# Patient Record
Sex: FEMALE | Race: WHITE | Employment: FULL TIME | URBAN - METROPOLITAN AREA
[De-identification: names, ages, dates, MRNs, and addresses within clinical notes are randomized per-mention and may not be internally consistent; named-entity substitution may affect disease eponyms.]

---

## 2017-02-15 ENCOUNTER — ANESTHESIA EVENT (OUTPATIENT)
Dept: SURGERY | Age: 42
End: 2017-02-15
Payer: COMMERCIAL

## 2017-02-15 NOTE — PERIOP NOTES
Spoke to Shon Caballero at Dr. Jose E Coleman office earlier this morning requesting patient come to PAT today for a T&S. Patient is not able to come.   DOS: 2/16/2017

## 2017-02-16 ENCOUNTER — ANESTHESIA (OUTPATIENT)
Dept: SURGERY | Age: 42
End: 2017-02-16
Payer: COMMERCIAL

## 2017-02-16 ENCOUNTER — HOSPITAL ENCOUNTER (OUTPATIENT)
Age: 42
Setting detail: OBSERVATION
Discharge: HOME | End: 2017-02-17
Attending: OBSTETRICS & GYNECOLOGY | Admitting: OBSTETRICS & GYNECOLOGY
Payer: COMMERCIAL

## 2017-02-16 ENCOUNTER — SURGERY (OUTPATIENT)
Age: 42
End: 2017-02-16

## 2017-02-16 LAB
ABO + RH BLD: NORMAL
ANION GAP BLD CALC-SCNC: 9 MMOL/L (ref 5–15)
BASOPHILS # BLD AUTO: 0 K/UL (ref 0–0.1)
BASOPHILS # BLD: 1 % (ref 0–1)
BLOOD GROUP ANTIBODIES SERPL: NORMAL
BUN SERPL-MCNC: 13 MG/DL (ref 6–20)
BUN/CREAT SERPL: 19 (ref 12–20)
CALCIUM SERPL-MCNC: 9.2 MG/DL (ref 8.5–10.1)
CHLORIDE SERPL-SCNC: 104 MMOL/L (ref 97–108)
CO2 SERPL-SCNC: 26 MMOL/L (ref 21–32)
CREAT SERPL-MCNC: 0.68 MG/DL (ref 0.55–1.02)
EOSINOPHIL # BLD: 0.2 K/UL (ref 0–0.4)
EOSINOPHIL NFR BLD: 4 % (ref 0–7)
ERYTHROCYTE [DISTWIDTH] IN BLOOD BY AUTOMATED COUNT: 12.9 % (ref 11.5–14.5)
GLUCOSE SERPL-MCNC: 98 MG/DL (ref 65–100)
HCG UR QL: NEGATIVE
HCT VFR BLD AUTO: 43.3 % (ref 35–47)
HGB BLD-MCNC: 14.6 G/DL (ref 11.5–16)
LYMPHOCYTES # BLD AUTO: 38 % (ref 12–49)
LYMPHOCYTES # BLD: 2.5 K/UL (ref 0.8–3.5)
MCH RBC QN AUTO: 30.5 PG (ref 26–34)
MCHC RBC AUTO-ENTMCNC: 33.7 G/DL (ref 30–36.5)
MCV RBC AUTO: 90.4 FL (ref 80–99)
MONOCYTES # BLD: 0.6 K/UL (ref 0–1)
MONOCYTES NFR BLD AUTO: 9 % (ref 5–13)
NEUTS SEG # BLD: 3.1 K/UL (ref 1.8–8)
NEUTS SEG NFR BLD AUTO: 48 % (ref 32–75)
PLATELET # BLD AUTO: 265 K/UL (ref 150–400)
POTASSIUM SERPL-SCNC: 3.3 MMOL/L (ref 3.5–5.1)
RBC # BLD AUTO: 4.79 M/UL (ref 3.8–5.2)
SODIUM SERPL-SCNC: 139 MMOL/L (ref 136–145)
SPECIMEN EXP DATE BLD: NORMAL
WBC # BLD AUTO: 6.4 K/UL (ref 3.6–11)

## 2017-02-16 PROCEDURE — 99218 HC RM OBSERVATION: CPT

## 2017-02-16 PROCEDURE — 77030008756 HC TU IRR SUC STRY -B: Performed by: OBSTETRICS & GYNECOLOGY

## 2017-02-16 PROCEDURE — 74011250636 HC RX REV CODE- 250/636: Performed by: ANESTHESIOLOGY

## 2017-02-16 PROCEDURE — 77030008771 HC TU NG SALEM SUMP -A: Performed by: NURSE ANESTHETIST, CERTIFIED REGISTERED

## 2017-02-16 PROCEDURE — 77030008520 HC TBNG INSUF HFLO STOR -B: Performed by: OBSTETRICS & GYNECOLOGY

## 2017-02-16 PROCEDURE — 77030018673: Performed by: OBSTETRICS & GYNECOLOGY

## 2017-02-16 PROCEDURE — 74011250636 HC RX REV CODE- 250/636

## 2017-02-16 PROCEDURE — 77030018836 HC SOL IRR NACL ICUM -A: Performed by: OBSTETRICS & GYNECOLOGY

## 2017-02-16 PROCEDURE — 77030010547 HC BG URIN W/UMETER -A

## 2017-02-16 PROCEDURE — 76210000000 HC OR PH I REC 2 TO 2.5 HR: Performed by: OBSTETRICS & GYNECOLOGY

## 2017-02-16 PROCEDURE — 77030022704 HC SUT VLOC COVD -B: Performed by: OBSTETRICS & GYNECOLOGY

## 2017-02-16 PROCEDURE — 74011258636 HC RX REV CODE- 258/636: Performed by: OBSTETRICS & GYNECOLOGY

## 2017-02-16 PROCEDURE — 74011000250 HC RX REV CODE- 250: Performed by: ANESTHESIOLOGY

## 2017-02-16 PROCEDURE — 77030034850: Performed by: OBSTETRICS & GYNECOLOGY

## 2017-02-16 PROCEDURE — 77030028268: Performed by: OBSTETRICS & GYNECOLOGY

## 2017-02-16 PROCEDURE — 77030026438 HC STYL ET INTUB CARD -A: Performed by: NURSE ANESTHETIST, CERTIFIED REGISTERED

## 2017-02-16 PROCEDURE — 77030035048 HC TRCR ENDOSC OPTCL COVD -B: Performed by: OBSTETRICS & GYNECOLOGY

## 2017-02-16 PROCEDURE — 77030002888 HC SUT CHRMC J&J -A: Performed by: OBSTETRICS & GYNECOLOGY

## 2017-02-16 PROCEDURE — 74011000250 HC RX REV CODE- 250: Performed by: OBSTETRICS & GYNECOLOGY

## 2017-02-16 PROCEDURE — 77030016151 HC PROTCTR LNS DFOG COVD -B: Performed by: OBSTETRICS & GYNECOLOGY

## 2017-02-16 PROCEDURE — 77030011640 HC PAD GRND REM COVD -A: Performed by: OBSTETRICS & GYNECOLOGY

## 2017-02-16 PROCEDURE — 77030002933 HC SUT MCRYL J&J -A: Performed by: OBSTETRICS & GYNECOLOGY

## 2017-02-16 PROCEDURE — 74011250636 HC RX REV CODE- 250/636: Performed by: OBSTETRICS & GYNECOLOGY

## 2017-02-16 PROCEDURE — 36415 COLL VENOUS BLD VENIPUNCTURE: CPT | Performed by: OBSTETRICS & GYNECOLOGY

## 2017-02-16 PROCEDURE — 77030008684 HC TU ET CUF COVD -B: Performed by: NURSE ANESTHETIST, CERTIFIED REGISTERED

## 2017-02-16 PROCEDURE — 77030035277 HC OBTRTR BLDELSS DISP INTU -B: Performed by: OBSTETRICS & GYNECOLOGY

## 2017-02-16 PROCEDURE — 77030035044 HC TRCR ENDOSC VRSPRT BLDLSS COVD -C: Performed by: OBSTETRICS & GYNECOLOGY

## 2017-02-16 PROCEDURE — 77030019908 HC STETH ESOPH SIMS -A: Performed by: NURSE ANESTHETIST, CERTIFIED REGISTERED

## 2017-02-16 PROCEDURE — 85025 COMPLETE CBC W/AUTO DIFF WBC: CPT | Performed by: OBSTETRICS & GYNECOLOGY

## 2017-02-16 PROCEDURE — 74011000250 HC RX REV CODE- 250

## 2017-02-16 PROCEDURE — 81025 URINE PREGNANCY TEST: CPT

## 2017-02-16 PROCEDURE — 80048 BASIC METABOLIC PNL TOTAL CA: CPT | Performed by: OBSTETRICS & GYNECOLOGY

## 2017-02-16 PROCEDURE — 77030010507 HC ADH SKN DERMBND J&J -B: Performed by: OBSTETRICS & GYNECOLOGY

## 2017-02-16 PROCEDURE — 77030020061 HC IV BLD WRMR ADMIN SET 3M -B: Performed by: NURSE ANESTHETIST, CERTIFIED REGISTERED

## 2017-02-16 PROCEDURE — 74011250637 HC RX REV CODE- 250/637: Performed by: OBSTETRICS & GYNECOLOGY

## 2017-02-16 PROCEDURE — 76060000037 HC ANESTHESIA 3 TO 3.5 HR: Performed by: OBSTETRICS & GYNECOLOGY

## 2017-02-16 PROCEDURE — 77030013079 HC BLNKT BAIR HGGR 3M -A: Performed by: NURSE ANESTHETIST, CERTIFIED REGISTERED

## 2017-02-16 PROCEDURE — 77030035043 HC TRCR ENDOSC OPTCL BLDLSS COVD -B: Performed by: OBSTETRICS & GYNECOLOGY

## 2017-02-16 PROCEDURE — 77030037032 HC INSRT SCIS CLICKLLINE DISP STOR -B: Performed by: OBSTETRICS & GYNECOLOGY

## 2017-02-16 PROCEDURE — 76010000878 HC OR TIME 3 TO 3.5HR INTENSV - TIER 2: Performed by: OBSTETRICS & GYNECOLOGY

## 2017-02-16 PROCEDURE — 88307 TISSUE EXAM BY PATHOLOGIST: CPT | Performed by: OBSTETRICS & GYNECOLOGY

## 2017-02-16 PROCEDURE — 77030019927 HC TBNG IRR CYSTO BAXT -A: Performed by: OBSTETRICS & GYNECOLOGY

## 2017-02-16 PROCEDURE — 77030009848 HC PASSR SUT SET COOP -C: Performed by: OBSTETRICS & GYNECOLOGY

## 2017-02-16 PROCEDURE — 86900 BLOOD TYPING SEROLOGIC ABO: CPT | Performed by: OBSTETRICS & GYNECOLOGY

## 2017-02-16 PROCEDURE — 77030003575 HC NDL INSUF ENDOPTH J&J -B: Performed by: OBSTETRICS & GYNECOLOGY

## 2017-02-16 PROCEDURE — 77030018832 HC SOL IRR H20 ICUM -A: Performed by: OBSTETRICS & GYNECOLOGY

## 2017-02-16 PROCEDURE — 77030018778 HC MANIP UTER VCAR CNMD -B: Performed by: OBSTETRICS & GYNECOLOGY

## 2017-02-16 PROCEDURE — 77030020782 HC GWN BAIR PAWS FLX 3M -B

## 2017-02-16 PROCEDURE — 77030031139 HC SUT VCRL2 J&J -A: Performed by: OBSTETRICS & GYNECOLOGY

## 2017-02-16 RX ORDER — DEXTROSE, SODIUM CHLORIDE, SODIUM LACTATE, POTASSIUM CHLORIDE, AND CALCIUM CHLORIDE 5; .6; .31; .03; .02 G/100ML; G/100ML; G/100ML; G/100ML; G/100ML
125 INJECTION, SOLUTION INTRAVENOUS CONTINUOUS
Status: DISPENSED | OUTPATIENT
Start: 2017-02-16 | End: 2017-02-17

## 2017-02-16 RX ORDER — ZOLPIDEM TARTRATE 5 MG/1
5 TABLET ORAL
Status: DISCONTINUED | OUTPATIENT
Start: 2017-02-16 | End: 2017-02-17 | Stop reason: HOSPADM

## 2017-02-16 RX ORDER — DIPHENHYDRAMINE HYDROCHLORIDE 50 MG/ML
12.5 INJECTION, SOLUTION INTRAMUSCULAR; INTRAVENOUS
Status: DISCONTINUED | OUTPATIENT
Start: 2017-02-16 | End: 2017-02-17 | Stop reason: HOSPADM

## 2017-02-16 RX ORDER — HYDROMORPHONE HCL IN 0.9% NACL 15 MG/30ML
PATIENT CONTROLLED ANALGESIA VIAL INTRAVENOUS CONTINUOUS
Status: DISCONTINUED | OUTPATIENT
Start: 2017-02-16 | End: 2017-02-17 | Stop reason: HOSPADM

## 2017-02-16 RX ORDER — SODIUM CHLORIDE 0.9 % (FLUSH) 0.9 %
5-10 SYRINGE (ML) INJECTION EVERY 8 HOURS
Status: DISCONTINUED | OUTPATIENT
Start: 2017-02-16 | End: 2017-02-16 | Stop reason: HOSPADM

## 2017-02-16 RX ORDER — DEXAMETHASONE SODIUM PHOSPHATE 4 MG/ML
INJECTION, SOLUTION INTRA-ARTICULAR; INTRALESIONAL; INTRAMUSCULAR; INTRAVENOUS; SOFT TISSUE AS NEEDED
Status: DISCONTINUED | OUTPATIENT
Start: 2017-02-16 | End: 2017-02-16 | Stop reason: HOSPADM

## 2017-02-16 RX ORDER — PROPOFOL 10 MG/ML
INJECTION, EMULSION INTRAVENOUS AS NEEDED
Status: DISCONTINUED | OUTPATIENT
Start: 2017-02-16 | End: 2017-02-16 | Stop reason: HOSPADM

## 2017-02-16 RX ORDER — LORAZEPAM 1 MG/1
1 TABLET ORAL
Status: DISCONTINUED | OUTPATIENT
Start: 2017-02-16 | End: 2017-02-17 | Stop reason: HOSPADM

## 2017-02-16 RX ORDER — GLYCOPYRROLATE 0.2 MG/ML
INJECTION INTRAMUSCULAR; INTRAVENOUS AS NEEDED
Status: DISCONTINUED | OUTPATIENT
Start: 2017-02-16 | End: 2017-02-16 | Stop reason: HOSPADM

## 2017-02-16 RX ORDER — NEOSTIGMINE METHYLSULFATE 1 MG/ML
INJECTION INTRAVENOUS AS NEEDED
Status: DISCONTINUED | OUTPATIENT
Start: 2017-02-16 | End: 2017-02-16 | Stop reason: HOSPADM

## 2017-02-16 RX ORDER — LIDOCAINE HYDROCHLORIDE 20 MG/ML
INJECTION, SOLUTION EPIDURAL; INFILTRATION; INTRACAUDAL; PERINEURAL AS NEEDED
Status: DISCONTINUED | OUTPATIENT
Start: 2017-02-16 | End: 2017-02-16 | Stop reason: HOSPADM

## 2017-02-16 RX ORDER — DOCUSATE SODIUM 100 MG/1
100 CAPSULE, LIQUID FILLED ORAL 2 TIMES DAILY
Status: DISCONTINUED | OUTPATIENT
Start: 2017-02-16 | End: 2017-02-17 | Stop reason: HOSPADM

## 2017-02-16 RX ORDER — SODIUM CHLORIDE, SODIUM LACTATE, POTASSIUM CHLORIDE, CALCIUM CHLORIDE 600; 310; 30; 20 MG/100ML; MG/100ML; MG/100ML; MG/100ML
25 INJECTION, SOLUTION INTRAVENOUS CONTINUOUS
Status: DISCONTINUED | OUTPATIENT
Start: 2017-02-16 | End: 2017-02-16 | Stop reason: HOSPADM

## 2017-02-16 RX ORDER — HYDROMORPHONE HYDROCHLORIDE 1 MG/ML
1 INJECTION, SOLUTION INTRAMUSCULAR; INTRAVENOUS; SUBCUTANEOUS
Status: DISCONTINUED | OUTPATIENT
Start: 2017-02-16 | End: 2017-02-17 | Stop reason: HOSPADM

## 2017-02-16 RX ORDER — HYDROMORPHONE HYDROCHLORIDE 2 MG/ML
INJECTION, SOLUTION INTRAMUSCULAR; INTRAVENOUS; SUBCUTANEOUS AS NEEDED
Status: DISCONTINUED | OUTPATIENT
Start: 2017-02-16 | End: 2017-02-16 | Stop reason: HOSPADM

## 2017-02-16 RX ORDER — METHYLENE BLUE 10 MG/ML
INJECTION INTRAVENOUS AS NEEDED
Status: DISCONTINUED | OUTPATIENT
Start: 2017-02-16 | End: 2017-02-16 | Stop reason: HOSPADM

## 2017-02-16 RX ORDER — SODIUM CHLORIDE, SODIUM LACTATE, POTASSIUM CHLORIDE, CALCIUM CHLORIDE 600; 310; 30; 20 MG/100ML; MG/100ML; MG/100ML; MG/100ML
1000 INJECTION, SOLUTION INTRAVENOUS CONTINUOUS
Status: DISCONTINUED | OUTPATIENT
Start: 2017-02-16 | End: 2017-02-16 | Stop reason: HOSPADM

## 2017-02-16 RX ORDER — ONDANSETRON 2 MG/ML
4 INJECTION INTRAMUSCULAR; INTRAVENOUS
Status: DISCONTINUED | OUTPATIENT
Start: 2017-02-16 | End: 2017-02-17 | Stop reason: HOSPADM

## 2017-02-16 RX ORDER — MIDAZOLAM HYDROCHLORIDE 1 MG/ML
INJECTION, SOLUTION INTRAMUSCULAR; INTRAVENOUS AS NEEDED
Status: DISCONTINUED | OUTPATIENT
Start: 2017-02-16 | End: 2017-02-16 | Stop reason: HOSPADM

## 2017-02-16 RX ORDER — BUPIVACAINE HYDROCHLORIDE 5 MG/ML
30 INJECTION, SOLUTION EPIDURAL; INTRACAUDAL ONCE
Status: COMPLETED | OUTPATIENT
Start: 2017-02-16 | End: 2017-02-16

## 2017-02-16 RX ORDER — ROCURONIUM BROMIDE 10 MG/ML
INJECTION, SOLUTION INTRAVENOUS AS NEEDED
Status: DISCONTINUED | OUTPATIENT
Start: 2017-02-16 | End: 2017-02-16 | Stop reason: HOSPADM

## 2017-02-16 RX ORDER — METRONIDAZOLE 500 MG/100ML
500 INJECTION, SOLUTION INTRAVENOUS ONCE
Status: COMPLETED | OUTPATIENT
Start: 2017-02-16 | End: 2017-02-16

## 2017-02-16 RX ORDER — FENTANYL CITRATE 50 UG/ML
INJECTION, SOLUTION INTRAMUSCULAR; INTRAVENOUS AS NEEDED
Status: DISCONTINUED | OUTPATIENT
Start: 2017-02-16 | End: 2017-02-16 | Stop reason: HOSPADM

## 2017-02-16 RX ORDER — SODIUM CHLORIDE, SODIUM LACTATE, POTASSIUM CHLORIDE, CALCIUM CHLORIDE 600; 310; 30; 20 MG/100ML; MG/100ML; MG/100ML; MG/100ML
125 INJECTION, SOLUTION INTRAVENOUS CONTINUOUS
Status: DISCONTINUED | OUTPATIENT
Start: 2017-02-16 | End: 2017-02-16 | Stop reason: HOSPADM

## 2017-02-16 RX ORDER — ONDANSETRON 2 MG/ML
INJECTION INTRAMUSCULAR; INTRAVENOUS AS NEEDED
Status: DISCONTINUED | OUTPATIENT
Start: 2017-02-16 | End: 2017-02-16 | Stop reason: HOSPADM

## 2017-02-16 RX ORDER — SODIUM CHLORIDE 0.9 % (FLUSH) 0.9 %
5-10 SYRINGE (ML) INJECTION AS NEEDED
Status: DISCONTINUED | OUTPATIENT
Start: 2017-02-16 | End: 2017-02-16 | Stop reason: HOSPADM

## 2017-02-16 RX ORDER — KETOROLAC TROMETHAMINE 30 MG/ML
30 INJECTION, SOLUTION INTRAMUSCULAR; INTRAVENOUS EVERY 6 HOURS
Status: COMPLETED | OUTPATIENT
Start: 2017-02-16 | End: 2017-02-17

## 2017-02-16 RX ORDER — OXYCODONE AND ACETAMINOPHEN 5; 325 MG/1; MG/1
2 TABLET ORAL
Status: DISCONTINUED | OUTPATIENT
Start: 2017-02-16 | End: 2017-02-17 | Stop reason: HOSPADM

## 2017-02-16 RX ORDER — NALOXONE HYDROCHLORIDE 0.4 MG/ML
0.4 INJECTION, SOLUTION INTRAMUSCULAR; INTRAVENOUS; SUBCUTANEOUS AS NEEDED
Status: DISCONTINUED | OUTPATIENT
Start: 2017-02-16 | End: 2017-02-17 | Stop reason: HOSPADM

## 2017-02-16 RX ORDER — KETOROLAC TROMETHAMINE 30 MG/ML
INJECTION, SOLUTION INTRAMUSCULAR; INTRAVENOUS AS NEEDED
Status: DISCONTINUED | OUTPATIENT
Start: 2017-02-16 | End: 2017-02-16 | Stop reason: HOSPADM

## 2017-02-16 RX ORDER — LIDOCAINE HYDROCHLORIDE 10 MG/ML
0.1 INJECTION, SOLUTION EPIDURAL; INFILTRATION; INTRACAUDAL; PERINEURAL AS NEEDED
Status: DISCONTINUED | OUTPATIENT
Start: 2017-02-16 | End: 2017-02-16 | Stop reason: HOSPADM

## 2017-02-16 RX ORDER — CEFAZOLIN SODIUM IN 0.9 % NACL 2 G/50 ML
2 INTRAVENOUS SOLUTION, PIGGYBACK (ML) INTRAVENOUS ONCE
Status: COMPLETED | OUTPATIENT
Start: 2017-02-16 | End: 2017-02-16

## 2017-02-16 RX ORDER — HYDROMORPHONE HYDROCHLORIDE 1 MG/ML
.25-.5 INJECTION, SOLUTION INTRAMUSCULAR; INTRAVENOUS; SUBCUTANEOUS
Status: DISCONTINUED | OUTPATIENT
Start: 2017-02-16 | End: 2017-02-16 | Stop reason: HOSPADM

## 2017-02-16 RX ADMIN — DEXAMETHASONE SODIUM PHOSPHATE 8 MG: 4 INJECTION, SOLUTION INTRA-ARTICULAR; INTRALESIONAL; INTRAMUSCULAR; INTRAVENOUS; SOFT TISSUE at 08:17

## 2017-02-16 RX ADMIN — FENTANYL CITRATE 50 MCG: 50 INJECTION, SOLUTION INTRAMUSCULAR; INTRAVENOUS at 09:28

## 2017-02-16 RX ADMIN — Medication: at 19:10

## 2017-02-16 RX ADMIN — FENTANYL CITRATE 50 MCG: 50 INJECTION, SOLUTION INTRAMUSCULAR; INTRAVENOUS at 08:50

## 2017-02-16 RX ADMIN — DOCUSATE SODIUM 100 MG: 100 CAPSULE ORAL at 18:05

## 2017-02-16 RX ADMIN — MIDAZOLAM HYDROCHLORIDE 2 MG: 1 INJECTION, SOLUTION INTRAMUSCULAR; INTRAVENOUS at 07:43

## 2017-02-16 RX ADMIN — ROCURONIUM BROMIDE 15 MG: 10 INJECTION, SOLUTION INTRAVENOUS at 08:50

## 2017-02-16 RX ADMIN — SODIUM CHLORIDE, POTASSIUM CHLORIDE, SODIUM LACTATE AND CALCIUM CHLORIDE: 600; 310; 30; 20 INJECTION, SOLUTION INTRAVENOUS at 09:37

## 2017-02-16 RX ADMIN — KETOROLAC TROMETHAMINE 30 MG: 30 INJECTION, SOLUTION INTRAMUSCULAR; INTRAVENOUS at 10:35

## 2017-02-16 RX ADMIN — SODIUM CHLORIDE, POTASSIUM CHLORIDE, SODIUM LACTATE AND CALCIUM CHLORIDE: 600; 310; 30; 20 INJECTION, SOLUTION INTRAVENOUS at 07:00

## 2017-02-16 RX ADMIN — BUPIVACAINE HYDROCHLORIDE 15 ML: 5 INJECTION, SOLUTION EPIDURAL; INTRACAUDAL; PERINEURAL at 10:35

## 2017-02-16 RX ADMIN — ROCURONIUM BROMIDE 15 MG: 10 INJECTION, SOLUTION INTRAVENOUS at 09:19

## 2017-02-16 RX ADMIN — LIDOCAINE HYDROCHLORIDE 30 MG: 20 INJECTION, SOLUTION EPIDURAL; INFILTRATION; INTRACAUDAL; PERINEURAL at 07:48

## 2017-02-16 RX ADMIN — CEFAZOLIN 2 G: 1 INJECTION, POWDER, FOR SOLUTION INTRAMUSCULAR; INTRAVENOUS; PARENTERAL at 07:57

## 2017-02-16 RX ADMIN — FENTANYL CITRATE 50 MCG: 50 INJECTION, SOLUTION INTRAMUSCULAR; INTRAVENOUS at 07:48

## 2017-02-16 RX ADMIN — METRONIDAZOLE 500 MG: 500 INJECTION, SOLUTION INTRAVENOUS at 07:57

## 2017-02-16 RX ADMIN — SODIUM CHLORIDE, SODIUM LACTATE, POTASSIUM CHLORIDE, CALCIUM CHLORIDE, AND DEXTROSE MONOHYDRATE 125 ML/HR: 600; 310; 30; 20; 5 INJECTION, SOLUTION INTRAVENOUS at 19:50

## 2017-02-16 RX ADMIN — SODIUM CHLORIDE 12.5 MG: 9 INJECTION INTRAMUSCULAR; INTRAVENOUS; SUBCUTANEOUS at 11:20

## 2017-02-16 RX ADMIN — ONDANSETRON 4 MG: 2 INJECTION INTRAMUSCULAR; INTRAVENOUS at 10:35

## 2017-02-16 RX ADMIN — METHYLENE BLUE 5 ML: 10 INJECTION INTRAVENOUS at 10:14

## 2017-02-16 RX ADMIN — FENTANYL CITRATE 50 MCG: 50 INJECTION, SOLUTION INTRAMUSCULAR; INTRAVENOUS at 07:57

## 2017-02-16 RX ADMIN — PROPOFOL 120 MG: 10 INJECTION, EMULSION INTRAVENOUS at 07:48

## 2017-02-16 RX ADMIN — KETOROLAC TROMETHAMINE 30 MG: 30 INJECTION, SOLUTION INTRAMUSCULAR at 18:05

## 2017-02-16 RX ADMIN — ROCURONIUM BROMIDE 30 MG: 10 INJECTION, SOLUTION INTRAVENOUS at 07:50

## 2017-02-16 RX ADMIN — FENTANYL CITRATE 50 MCG: 50 INJECTION, SOLUTION INTRAMUSCULAR; INTRAVENOUS at 07:45

## 2017-02-16 RX ADMIN — Medication: at 11:48

## 2017-02-16 RX ADMIN — ROCURONIUM BROMIDE 5 MG: 10 INJECTION, SOLUTION INTRAVENOUS at 07:48

## 2017-02-16 RX ADMIN — HYDROMORPHONE HYDROCHLORIDE 0.5 MG: 1 INJECTION, SOLUTION INTRAMUSCULAR; INTRAVENOUS; SUBCUTANEOUS at 11:21

## 2017-02-16 RX ADMIN — NEOSTIGMINE METHYLSULFATE 2 MG: 1 INJECTION INTRAVENOUS at 10:35

## 2017-02-16 RX ADMIN — HYDROMORPHONE HYDROCHLORIDE 0.5 MG: 2 INJECTION, SOLUTION INTRAMUSCULAR; INTRAVENOUS; SUBCUTANEOUS at 10:58

## 2017-02-16 RX ADMIN — MIDAZOLAM HYDROCHLORIDE 3 MG: 1 INJECTION, SOLUTION INTRAMUSCULAR; INTRAVENOUS at 07:40

## 2017-02-16 RX ADMIN — GLYCOPYRROLATE 0.2 MG: 0.2 INJECTION INTRAMUSCULAR; INTRAVENOUS at 10:35

## 2017-02-16 RX ADMIN — SODIUM CHLORIDE, SODIUM LACTATE, POTASSIUM CHLORIDE, CALCIUM CHLORIDE, AND DEXTROSE MONOHYDRATE 125 ML/HR: 600; 310; 30; 20; 5 INJECTION, SOLUTION INTRAVENOUS at 12:04

## 2017-02-16 NOTE — ANESTHESIA POSTPROCEDURE EVALUATION
Post-Anesthesia Evaluation and Assessment    Patient: Magali Hoang MRN: [id number]  SSN: [id number]    YOB: 1975  Age: 37 y.o. Sex: female       Cardiovascular Function/Vital Signs  Visit Vitals    /55    Pulse (!) 57    Temp 36.5 °C (97.7 °F)    Resp 10    Ht 5' 2.5\" (1.588 m)    Wt 67.1 kg (147 lb 14.9 oz)    SpO2 100%    BMI 26.63 kg/m2       Patient is status post general anesthesia for Procedure(s):  CYSTOSCOPY, DAVINCI ASSISTED TOTAL LAPAROSCOPIC HYSTERECTOMY  CYSTOSCOPY. Nausea/Vomiting: None    Postoperative hydration reviewed and adequate. Pain:  Pain Scale 1: Numeric (0 - 10) (02/16/17 1155)  Pain Intensity 1: 6 (02/16/17 1155)   Managed    Neurological Status:   Neuro (WDL): Exceptions to WDL (02/16/17 1155)  Neuro  Neurologic State: Drowsy; Eyes open spontaneously (02/16/17 1155)  Orientation Level: Oriented to person;Oriented to place;Oriented to situation (02/16/17 1155)  Cognition: Follows commands (02/16/17 1155)  Speech: Clear (02/16/17 1155)  LUE Motor Response: Purposeful;Spontaneous  (02/16/17 1155)  LLE Motor Response: Purposeful;Spontaneous  (02/16/17 1155)  RUE Motor Response: Purposeful;Spontaneous  (02/16/17 1155)  RLE Motor Response: Purposeful;Spontaneous  (02/16/17 1155)   At baseline    Mental Status and Level of Consciousness: Arousable    Pulmonary Status:   O2 Device: Nasal cannula (02/16/17 1155)   Adequate oxygenation and airway patent    Complications related to anesthesia: None    Post-anesthesia assessment completed.  No concerns    Signed By: Marifer Reyes MD     February 16, 2017

## 2017-02-16 NOTE — ANESTHESIA PREPROCEDURE EVALUATION
Anesthetic History     Pseudocholinesterase deficiency          Review of Systems / Medical History  Patient summary reviewed, nursing notes reviewed and pertinent labs reviewed    Pulmonary  Within defined limits                 Neuro/Psych   Within defined limits           Cardiovascular  Within defined limits                Exercise tolerance: >4 METS     GI/Hepatic/Renal  Within defined limits              Endo/Other  Within defined limits           Other Findings              Physical Exam    Airway  Mallampati: I    Neck ROM: normal range of motion   Mouth opening: Normal     Cardiovascular  Regular rate and rhythm,  S1 and S2 normal,  no murmur, click, rub, or gallop  Rhythm: regular  Rate: normal         Dental  No notable dental hx       Pulmonary  Breath sounds clear to auscultation               Abdominal  GI exam deferred       Other Findings            Anesthetic Plan    ASA: 1  Anesthesia type: general          Induction: Intravenous  Anesthetic plan and risks discussed with: Patient      Note pt's mother and aunt have confirmed pseudocholinesterase deficiency

## 2017-02-16 NOTE — PROGRESS NOTES
Problem: Vaginal Hysterectomy: Day of Surgery  Goal: Activity/Safety  Outcome: Progressing Towards Goal  Bed rest  Goal: Nutrition/Diet  Outcome: Progressing Towards Goal  tolerating  Goal: Medications  Outcome: Progressing Towards Goal  Dilaudid PCA. IVF per order. Goal: Respiratory  Outcome: Progressing Towards Goal  2L NC. Demonstrates IS.   Goal: Treatments/Interventions/Procedures  Outcome: Resolved/Met Date Met:  02/16/17  Bilateral SCDs

## 2017-02-16 NOTE — PERIOP NOTES
TRANSFER - OUT REPORT:    Verbal report given to Ash El RN on Iglesia Merchant  being transferred to [facility](unit) for routine post - op       Report consisted of patients Situation, Background, Assessment and   Recommendations(SBAR). Information from the following report(s) SBAR, Kardex, OR Summary and MAR was reviewed with the receiving nurse. Lines:   Peripheral IV 02/16/17 Left Hand (Active)   Site Assessment Clean, dry, & intact 2/16/2017 11:06 AM   Phlebitis Assessment 0 2/16/2017 11:06 AM   Infiltration Assessment 0 2/16/2017 11:06 AM   Dressing Status Clean, dry, & intact 2/16/2017 11:06 AM   Dressing Type Transparent 2/16/2017 11:06 AM   Hub Color/Line Status Infusing 2/16/2017 11:06 AM   Alcohol Cap Used Yes 2/16/2017  6:42 AM       Peripheral IV 02/16/17 Right Hand (Active)   Site Assessment Clean, dry, & intact 2/16/2017 11:06 AM   Phlebitis Assessment 0 2/16/2017 11:06 AM   Infiltration Assessment 0 2/16/2017 11:06 AM   Dressing Status Clean, dry, & intact 2/16/2017 11:06 AM   Dressing Type Transparent 2/16/2017 11:06 AM   Hub Color/Line Status Capped 2/16/2017 11:35 AM   Alcohol Cap Used Yes 2/16/2017  7:00 AM        Opportunity for questions and clarification was provided.       Patient transported with:   O2 @ 2 liters  Registered Nurse      [facility] at [facility]

## 2017-02-16 NOTE — DISCHARGE SUMMARY
Gynecology Discharge Summary     Patient ID:  Matthew Sandy  [id number]  13 y.o.  1975    Admit date: 2/16/2017    Discharge date and time: 2/17/2017    Admission Diagnoses:    1. Fibroid uterus  2. Dyspareunia  3. Dysmenorrhea    Discharge Diagnoses:   1. Fibroid uterus  2. Endometriosis  3. Dyspareunia  4. Dysmenorrhea    Procedures for this admission:   1. DAVINCI ASSISTED TOTAL LAPAROSCOPIC HYSTERECTOMY  2. FULGARATION OF ENDOMETRIOSIS  3. LYSIS OF ADHESIONS  4. CYSTOSCOPY    Hospital Course: Uncomplicated post-op course. She was discharged home in the morning of POD#1. Disposition: Home or self care    Discharged Condition : good    Instructions: Follow-up in office  in 4 weeks.               Signed:  Leopold Brawn, MD  2/16/2017  11:28 AM

## 2017-02-16 NOTE — BRIEF OP NOTE
BRIEF OPERATIVE NOTE    Date of Procedure: 2/16/2017   Preoperative Diagnosis:   1. FIBROID UTERUS  2. DYSPAREUNIA  3. DYSMENORRHEA  Postoperative Diagnosis:   1. FIBROID UTERUS  2. ENDOMETRIOSIS  3. DYSPAREUNIA  4. DYSMENORRHEA    Procedure(s):  1. DAVINCI ASSISTED TOTAL LAPAROSCOPIC HYSTERECTOMY  2. FULGARATION OF ENDOMETRIOSIS  3. LYSIS OF ADHESIONS  4. CYSTOSCOPY  Surgeon(s) and Role:     * Vick Salazar MD - Primary       Surgical Staff:  Circ-1: Cindy De Paz RN  Circ-2: Matt Posey  Scrub Tech-1: Teresa Bond  Surg Asst-1: Terri Clements LPN  Event Time In   Incision Start 0815   Incision Close 1042     Anesthesia: General   Estimated Blood Loss: 150ml  Specimens:   ID Type Source Tests Collected by Time Destination   1 : UTERUS, CERVIX, BILATEAL FALLOPIAN TUBES Preservative Uterus  Vick Salazar MD 2/16/2017 1004 Pathology      Findings: Enlarged fibroid uterus. Dense adhesions of the rectum to the posterior surface of the uterus due to extensive endometriosis. Bilateral ovaries with dense adhesions to the uterine side walls. Endometriosis lesions noted in the ovarian fossa bilaterally. On cystoscopy after the hysterectomy grossly intact bladder with bilateral ureteral jets seen.    Complications: None  Implants: * No implants in log *    Dict: [id number]

## 2017-02-16 NOTE — IP AVS SNAPSHOT
[address] 
 
 
 [address] 
[phone] Patient: Richard Sanchez MRN: [id number] Aurea Orf You are allergic to the following Allergen Reactions Other Food Hives  
 J Luis Helm Codeine Other (comments) Hallucinations. Pt has used percocet before without problems Hazelnut Hives Spinach Hives Sudafed (Pseudoephedrine Hcl) Other (comments)  
 hallucinations Recent Documentation Height Weight Breastfeeding? BMI OB Status 1.588 m 67.1 kg No 26.63 kg/m2 Having regular periods Emergency Contacts Name Discharge Info Relation Home Work Mobile Colton Merchant DISCHARGE CAREGIVER [3] Spouse [3]   [phone] About your hospitalization You were admitted on:  February 16, 2017 You last received care in the:  [facility] 4M POST SURG ORT 2 You were discharged on:  February 17, 2017 Unit phone number:  [phone] Why you were hospitalized Your primary diagnosis was:  Not on File Providers Seen During Your Hospitalizations Provider Role Specialty Primary office phone Vick Friday, MD Attending Provider Obstetrics & Gynecology [phone] Your Primary Care Physician (PCP) Primary Care Physician Office Phone Office Fax UNKNOWN, PROVIDER ** None ** ** None ** Follow-up Information Follow up With Details Comments Contact Info Provider Unknown   Patient not available to ask Current Discharge Medication List  
  
START taking these medications Dose & Instructions Dispensing Information Comments Morning Noon Evening Bedtime  
 naproxen 500 mg tablet Commonly known as:  NAPROSYN Your next dose is: Today, Tomorrow Other:  _________ Dose:  500 mg Take 1 Tab by mouth two (2) times daily (with meals). Quantity:  30 Tab Refills:  0 ondansetron 4 mg disintegrating tablet Commonly known as:  ZOFRAN ODT Your next dose is: Today, Tomorrow Other:  _________ Dose:  4 mg Take 1 Tab by mouth every eight (8) hours as needed for Nausea. Quantity:  10 Tab Refills:  0  
     
   
   
   
  
 oxyCODONE-acetaminophen 5-325 mg per tablet Commonly known as:  PERCOCET Your next dose is: Today, Tomorrow Other:  _________ Dose:  2 Tab Take 2 Tabs by mouth every four (4) hours as needed. Max Daily Amount: 12 Tabs. Quantity:  30 Tab Refills:  0 Where to Get Your Medications Information on where to get these meds will be given to you by the nurse or doctor. ! Ask your nurse or doctor about these medications  
  naproxen 500 mg tablet  
 ondansetron 4 mg disintegrating tablet  
 oxyCODONE-acetaminophen 5-325 mg per tablet Discharge Instructions Laparoscopic Hysterectomy: What to Expect at [facility] Your Recovery You can expect to feel better and stronger each day, although you may need pain medicine for a week or two. You may get tired easily or have less energy than usual. This may last for several weeks after surgery. You will probably notice that your belly is swollen and puffy. This is common. The swelling will take several weeks to go down. You may take 4 to 6 weeks to fully recover. It is important to avoid lifting while you are recovering so that you can heal. 
 
Follow-up care is a key part of your treatment and safety. Be sure to make and go to all appointments, and call my office if you are having problems. How can you care for yourself at home? Activity Rest when you feel tired. Getting enough sleep will help you recover. Try to walk each day. Start by walking a little more than you did the day before. Bit by bit, increase the amount you walk. Walking boosts blood flow and helps prevent pneumonia and constipation. Avoid lifting anything that would make you strain. This may include grocery bags and milk containers, a heavy briefcase, dog food bags, a child, or a vacuum . Avoid strenuous activities, such as housework, aerobic exercise, or  weight lifting, until your doctor says it is okay. You may shower. If you have incisions in your belly, pat them dry when you are done. Do not take a bath for the first 2 weeks or until your doctor tells you it is okay. You may drive when you are no longer taking prescription pain medicine and can quickly move your foot from the gas pedal to the brake. You must also be able to sit comfortably for a long period of time, even if you do not plan on going far. You might get caught in traffic. You will probably need to take 2 to 4 weeks off from work. It depends on the type of work you do and how you feel. Your doctor will tell you when you can have sex again. Diet You can eat your normal diet. If your stomach is upset, try bland, low-fat foods like plain rice, broiled chicken, toast, and yogurt. Drink plenty of fluids (unless your doctor tells you not to). You may notice that your bowel movements are not regular right after your surgery. This is common. Try to avoid constipation and straining with bowel movements. You may want to take a fiber supplement or stool softener every day. If you have not had a bowel movement after a couple of days, ask your doctor about taking a mild laxative. Medicines If the doctor gave you a prescription medicine for pain, take it as prescribed. If you are not taking a prescription pain medicine, take an over-the-counter medicine such as acetaminophen (Tylenol), ibuprofen (Advil, Motrin), or naproxen (Aleve). Read and follow all instructions on the label. Do not take two or more pain medicines at the same time unless the doctor told you to. Many pain medicines contain acetaminophen, which is Tylenol. Too much Tylenol can be harmful. If your doctor prescribed antibiotics, take them as directed. Do not stop taking them just because you feel better. You need to take the full course of antibiotics. If you think your pain medicine is making you sick to your stomach: Take your medicine after meals (unless your doctor tells you not to). Ask your doctor for a different pain medicine. Incision care If you had surgery with a laparoscope, you may have skin glue or strips of tape over the cuts (incisions) the doctor made in your belly. Gently wash the area daily with warm, soapy water and pat it dry. Do not use other cleaning products, such as hydrogen peroxide which can make the wound heal more slowly. Do NOT bandage the incisions, but you may cover the areas with a gauze bandage if it weeps or rubs against clothing. Change the bandage every day. Keep the area clean and dry. Other instructions You may have some light vaginal bleeding. Wear sanitary pads if needed. Do not douche or use tampons. When should you call for help? Call 911 anytime you think you may need emergency care. For example, call if: You pass out (lose consciousness). You have sudden chest pain and shortness of breath, or you cough up blood. You have severe pain in your belly. Call your doctor now or seek immediate medical care if: 
You have bright red vaginal bleeding that soaks one or more pads in an hour, or you have large clots. Your have foul-smelling discharge from your vagina. You are sick to your stomach or cannot keep fluids down. You have pain that does not get better after you take pain medicine. You have loose stitches, or your incision comes open. You have signs of infection, such as: Increased pain, swelling, warmth, or redness. Red streaks leading from your incision. Pus draining from your incision. Swollen lymph nodes in your neck, armpits, or groin. A fever. You have signs of a blood clot, such as: Pain in your calf, back of knee, thigh, or groin. Redness and swelling in your leg or groin. You have trouble passing urine or stool, especially if you have pain or swelling in your lower belly. You have hot flashes, sweating, flushing, or a fast or pounding heartbeat. Watch closely for changes in your health, and be sure to contact your doctor if: You do not have a bowel movement after taking a laxative. Discharge Orders None [employer] Announcement We are excited to announce that we are making your provider's discharge notes available to you in [employer]. You will see these notes when they are completed and signed by the physician that discharged you from your recent hospital stay. If you have any questions or concerns about any information you see in [employer], please call the Health Information Department where you were seen or reach out to your Primary Care Provider for more information about your plan of care. Introducing [facility]! Yahaira Lui introduces [employer] patient portal. Now you can access parts of your medical record, email your doctor's office, and request medication refills online. 1. In your internet browser, go to [email or web address] 2. Click on the First Time User? Click Here link in the Sign In box. You will see the New Member Sign Up page. 3. Enter your [employer] Access Code exactly as it appears below. You will not need to use this code after youve completed the sign-up process. If you do not sign up before the expiration date, you must request a new code. · [employer] Access Code: [email or web address] Expires: 5/10/2017 11:06 AM 
 
4. Enter the last four digits of your Social Security Number (xxxx) and Date of Birth (mm/dd/yyyy) as indicated and click Submit. You will be taken to the next sign-up page. 5. Create a [employer] ID. This will be your [employer] login ID and cannot be changed, so think of one that is secure and easy to remember. 6. Create a [facility] password. You can change your password at any time. 7. Enter your Password Reset Question and Answer. This can be used at a later time if you forget your password. 8. Enter your e-mail address. You will receive e-mail notification when new information is available in [address] 9. Click Sign Up. You can now view and download portions of your medical record. 10. Click the Download Summary menu link to download a portable copy of your medical information. If you have questions, please visit the Frequently Asked Questions section of the [employer] website. Remember, [employer] is NOT to be used for urgent needs. For medical emergencies, dial 911. Now available from your iPhone and Android! General Information Please provide this summary of care documentation to your next provider. Patient Signature:  ____________________________________________________________ Date:  ____________________________________________________________  
  
Obey Childs Provider Signature:  ____________________________________________________________ Date:  ____________________________________________________________

## 2017-02-16 NOTE — PROGRESS NOTES
Bedside and Verbal shift change report given to Melanie Floyd RN (oncoming nurse) by Marvel Acevedo RN (offgoing nurse). Report included the following information SBAR, Kardex, Intake/Output, MAR and Recent Results.

## 2017-02-16 NOTE — PROGRESS NOTES
The patient and procedure were reexamined and readdressed. There has been no interval change from H&P. Patient is ready for surgery today.

## 2017-02-16 NOTE — IP AVS SNAPSHOT
Summary of Care Report The Summary of Care report has been created to help improve care coordination. Users with access to [employer] or [address] (Web-based application) may access additional patient information including the Discharge Summary. If you are not currently a [address] user and need more information, please call the number listed below in the [place] [address] section and ask to be connected with Medical Records. Facility Information Name Address Phone [place] [phone] Patient Information Patient Name Sex  Giovana Santiago ([id number]) Female 1975 Discharge Information Admitting Provider Service Area Unit Fox Bonilla MD / [place] [address] [address] [facility]  [phone] Discharge Provider Discharge Date/Time Discharge Disposition Destination (none) 2017 Midday (Pending) AHR (none) Patient Language Language ENGLISH [13] You are allergic to the following Allergen Reactions Other Food Hives  
 Myriam Nice Codeine Other (comments) Hallucinations. Pt has used percocet before without problems Hazelnut Hives Spinach Hives Sudafed (Pseudoephedrine Hcl) Other (comments)  
 hallucinations Current Discharge Medication List  
  
START taking these medications Dose & Instructions Dispensing Information Comments  
 naproxen 500 mg tablet Commonly known as:  NAPROSYN Dose:  500 mg Take 1 Tab by mouth two (2) times daily (with meals). Quantity:  30 Tab Refills:  0  
   
 ondansetron 4 mg disintegrating tablet Commonly known as:  ZOFRAN ODT Dose:  4 mg Take 1 Tab by mouth every eight (8) hours as needed for Nausea. Quantity:  10 Tab Refills:  0 oxyCODONE-acetaminophen 5-325 mg per tablet Commonly known as:  PERCOCET Dose:  2 Tab Take 2 Tabs by mouth every four (4) hours as needed. Max Daily Amount: 12 Tabs. Quantity:  30 Tab Refills:  0 Surgery Information ID Date/Time Status Primary Surgeon All Procedures Location [id number] 2/16/2017 33 Katie Ho MD CYSTOSCOPY, DAVINCI ASSISTED TOTAL LAPAROSCOPIC HYSTERECTOMY CYSTOSCOPY [facility] MAIN OR Follow-up Information Follow up With Details Comments Contact Info Provider Unknown   Patient not available to ask Discharge Instructions Laparoscopic Hysterectomy: What to Expect at [facility] Your Recovery You can expect to feel better and stronger each day, although you may need pain medicine for a week or two. You may get tired easily or have less energy than usual. This may last for several weeks after surgery. You will probably notice that your belly is swollen and puffy. This is common. The swelling will take several weeks to go down. You may take 4 to 6 weeks to fully recover. It is important to avoid lifting while you are recovering so that you can heal. 
 
Follow-up care is a key part of your treatment and safety. Be sure to make and go to all appointments, and call my office if you are having problems. How can you care for yourself at home? Activity Rest when you feel tired. Getting enough sleep will help you recover. Try to walk each day. Start by walking a little more than you did the day before. Bit by bit, increase the amount you walk. Walking boosts blood flow and helps prevent pneumonia and constipation. Avoid lifting anything that would make you strain. This may include grocery bags and milk containers, a heavy briefcase, dog food bags, a child, or a vacuum . Avoid strenuous activities, such as housework, aerobic exercise, or  weight lifting, until your doctor says it is okay. You may shower. If you have incisions in your belly, pat them dry when you are done. Do not take a bath for the first 2 weeks or until your doctor tells you it is okay. You may drive when you are no longer taking prescription pain medicine and can quickly move your foot from the gas pedal to the brake. You must also be able to sit comfortably for a long period of time, even if you do not plan on going far. You might get caught in traffic. You will probably need to take 2 to 4 weeks off from work. It depends on the type of work you do and how you feel. Your doctor will tell you when you can have sex again. Diet You can eat your normal diet. If your stomach is upset, try bland, low-fat foods like plain rice, broiled chicken, toast, and yogurt. Drink plenty of fluids (unless your doctor tells you not to). You may notice that your bowel movements are not regular right after your surgery. This is common. Try to avoid constipation and straining with bowel movements. You may want to take a fiber supplement or stool softener every day. If you have not had a bowel movement after a couple of days, ask your doctor about taking a mild laxative. Medicines If the doctor gave you a prescription medicine for pain, take it as prescribed. If you are not taking a prescription pain medicine, take an over-the-counter medicine such as acetaminophen (Tylenol), ibuprofen (Advil, Motrin), or naproxen (Aleve). Read and follow all instructions on the label. Do not take two or more pain medicines at the same time unless the doctor told you to. Many pain medicines contain acetaminophen, which is Tylenol. Too much Tylenol can be harmful. If your doctor prescribed antibiotics, take them as directed. Do not stop taking them just because you feel better. You need to take the full course of antibiotics.  
If you think your pain medicine is making you sick to your stomach: 
 Take your medicine after meals (unless your doctor tells you not to). Ask your doctor for a different pain medicine. Incision care If you had surgery with a laparoscope, you may have skin glue or strips of tape over the cuts (incisions) the doctor made in your belly. Gently wash the area daily with warm, soapy water and pat it dry. Do not use other cleaning products, such as hydrogen peroxide which can make the wound heal more slowly. Do NOT bandage the incisions, but you may cover the areas with a gauze bandage if it weeps or rubs against clothing. Change the bandage every day. Keep the area clean and dry. Other instructions You may have some light vaginal bleeding. Wear sanitary pads if needed. Do not douche or use tampons. When should you call for help? Call 911 anytime you think you may need emergency care. For example, call if: You pass out (lose consciousness). You have sudden chest pain and shortness of breath, or you cough up blood. You have severe pain in your belly. Call your doctor now or seek immediate medical care if: 
You have bright red vaginal bleeding that soaks one or more pads in an hour, or you have large clots. Your have foul-smelling discharge from your vagina. You are sick to your stomach or cannot keep fluids down. You have pain that does not get better after you take pain medicine. You have loose stitches, or your incision comes open. You have signs of infection, such as: Increased pain, swelling, warmth, or redness. Red streaks leading from your incision. Pus draining from your incision. Swollen lymph nodes in your neck, armpits, or groin. A fever. You have signs of a blood clot, such as: 
Pain in your calf, back of knee, thigh, or groin. Redness and swelling in your leg or groin. You have trouble passing urine or stool, especially if you have pain or swelling in your lower belly. You have hot flashes, sweating, flushing, or a fast or pounding heartbeat. Watch closely for changes in your health, and be sure to contact your doctor if: You do not have a bowel movement after taking a laxative. Chart Review Routing History Recipient Method Report Sent By Iglesia Mealing Provider Unknown, MD  
Patient not available to ask [address] Mail IP Auto Routed Notes Mary Limon MD [[id number]] 2/17/2017  8:59 AM 02/17/2017

## 2017-02-16 NOTE — PROGRESS NOTES
CM note:    Met with pt and her . No dc needs anticipated. OBS letter explained and provided. Thanks.   Robinson Self LCSW    Care Management Interventions  Current Support Network: Own Home, Lives with Spouse  Confirm Follow Up Transport: Family  Plan discussed with Pt/Family/Caregiver: Yes  Discharge Location  Discharge Placement: Home

## 2017-02-16 NOTE — OP NOTES
Yair Newby [facility]   [address] [facility], [address]   OP NOTE       Name:  Xin Cote   MR#:  [id number]   :  1975   Account #:  [de-identified]    Surgery Date:  2017   Date of Adm:  2017       PREOPERATIVE DIAGNOSES   1. Fibroid uterus. 2. Dyspareunia. 3. Dysmenorrhea. POSTOPERATIVE DIAGNOSES   1. Fibroid uterus. 2. Endometriosis. 3. Dyspareunia. 4. Dysmenorrhea. PROCEDURES PERFORMED   1. A da Clary assisted total laparoscopic hysterectomy. 2. Fulguration of endometriosis. 3. Lysis of adhesions. 4. Cystoscopy. SURGEON: Cherelle Ponce MD    ASSISTANTS    1. Karey Enciso   2. Lacey Cordero     ANESTHESIA: General endotracheal with Dr. Lana Astorga. SPECIMENS REMOVED: Sent to Pathology included uterus, bilateral   fallopian tubes and cervix greater than 250 g. ESTIMATED BLOOD LOSS: 150 mL. URINE OUTPUT: 300 mL. FINDINGS: Included a large fibroid uterus. Dense adhesions of the   rectum to the posterior surface of the uterus due to extensive   endometriosis. Bilateral ovaries with dense adhesions to the uterine   sidewalls encasing the ovaries. Endometriosis lesions noted in the   ovarian fossa bilaterally, and on the ovarian parenchyma itself. On   cystoscopy after the hysterectomy, a grossly intact bladder was seen,   with bilateral ureteral jets seen as well. COMPLICATIONS: None. PROCEDURE: After appropriate consent was obtained, the patient   was administered preoperative antibiotics and taken to the operating   room, where general anesthesia was administered and found to be   adequate. She was prepped and draped in a dorsal lithotomy position   in a normal sterile fashion. She was tilt tested to ensure she could   tolerate the positioning for the [employer] robot. When this was   successful, she was prepped and draped in a normal sterile fashion. The procedure was begun in the patient's vagina.  The speculum was   placed. The cervix was visualized, grasped with a tenaculum, and the   cervix was dilated progressively using Hegar dilators up to #9. Once   this was done, the [facility] manipulator was placed through the cervix   into the uterus in order to allow for uterine manipulation. Once this was   done, all instruments were then removed from the vagina, except for   the manipulator, and a Lancaster catheter was placed under sterile   conditions, draining clear urine. We then changed gloves and moved to   the abdominal portion of the case. The umbilicus was everted. A small incision was made in the umbilical   fold. A Veress needle with a syringe attached was introduced. Opening   patient pressure was noted to be 4. She was insufflated to a total   pressure of 15. We then placed, under direct visualization, our camera   port, followed by our assistant port and 2 trocars for our da Clary arms. These were all done under direct visualization, and excellent   hemostasis was noted. Once this was done, the [employer] robot was   brought into place and docked appropriately. The patient had been   placed in steep Trendelenburg positioning prior to bringing the robot to   the bedside. Once docking was complete, Dr. Margarita Mckenzie scrubbed out   and went to the console. The procedure was begun in the posterior aspect of the uterus, where   the rectum and omentum were densely adhered to the posterior   surface of the uterus. This was very cautiously dissected away, both   bluntly as well as sharply, in order to reflect the rectum into its normal   anatomical location. This was done to just below the level of the   external os of the cervix. There were still some adhesions noted;   however, it was very close to the bowel, and there was no interest in   potentially injuring the bowel to further dissect beyond our surgical   field. After this was done, we turned our attention to the fallopian tubes.  Both   the left and the right fallopian tube were cauterized, dissected and   removed through the assistant's port without difficulty. We then went to   work on dissecting the right ovary away from the uterus, which was   stuck to the uterus due to endometriosis, which was noted throughout. Endometriosis scars were noted throughout the pelvic cavity. We were   able to successfully reflect the right ovary away from the uterus. We   then cauterized and transected the utero-ovarian ligament and the   round ligament on the right side. We then went to the left side in similar   fashion. The left ovary was adhesed to the uterine sidewall. This had to   be removed, and the ovary reflected away before we cauterized and   transected both the utero-ovarian and round ligaments. Once this was   done, we dissected down the broad ligament on the left side into the   level of the internal os of the cervix, crossing over the cervix and   reflecting the bladder inferiorly away from our surgical field. We then   repeated the process on the right side, where we were now connected   with the bladder displaced anteriorly. The uterine vessels were   skeletonized, cauterized and transected using bipolar scissors. Care   was taken to try to limit the backbleeding of the uterus by cauterizing   the uterine vessels all the way down to the level of the internal os and   the cervix. This was done bilaterally, with care being taken to quickly   address any bleeding that was seen. The [facility] manipulator was then   elevated with the uterus in order to displace it away from the ovarian   fossa, and the potential of the ureters being nearby. We then made a   colpotomy incision, starting at approximately 3 o'clock on the uterus,   carrying around posteriorly to about 12 o'clock, and then in a   counterclockwise direction, and then finishing it in a clockwise direction   from 3 o'clock to 12 o'clock. Excellent hemostasis was noted.  The   uterus was then brought down to the vagina for removal.    Dr. Candi Bauer scrubbed back in to go to the vaginal portion of the case. Due to the size of the uterus, it was unable to be removed in 1 piece,   so the uterus had to be morcellated in a sharp manner in the vagina,   taking out pieces in order to decompress its size, where it finally could   be removed with the rest of it. Once this was done, some gloves were   placed in the vagina to act as a pneumo saver, and Dr. Candi Bauer went   back to the console. The vaginal cuff was then closed the V-Loc stitch   from right to left, then back from left to right, with care being taken to   incorporate the uterosacral ligaments as best we could for added   support. The pelvis was thoroughly irrigated. Any small bleeding was   coagulated. The right ovary was evaluated, because it had been more   difficult to dissect that ovary, and there was concern initially about its   viability. However, it looked healthy and pink with adequate perfusion,   and so it was left in place. We then placed Interceed over the vaginal   cuff. Again, excellent hemostasis was noted. The laparoscopic portion   of the procedure was completed. The [employer] tools were removed, the   arms were detached, and Dr. Candi Bauer left the console. We then went to the cystoscopy portion of the case. A 70-degree   cystoscope was placed through the urethra into the bladder.; The   bladder was thoroughly evaluated. There were no gross injuries to the   bladder noted. Both ureteral jets were seen, as methylene blue had   been given to the patient approximately 15 minutes prior to the   cystoscopy, and both jets were noted. We then removed the   cystoscope and replaced the Lancaster catheter. The abdominal incisions   were closed with 4-0 Monocryl and Dermabond. All counts were   correct for the procedures. The patient tolerated the procedures well,   and she was awakened and taken to the recovery room in stable   condition.         MICHAEL VIRAMONTES MD Adithya Deshpande / [facility] Backbrittany   D:  02/16/2017   11:18   T:  02/16/2017   12:14   Job #:  [id number]

## 2017-02-17 VITALS
OXYGEN SATURATION: 95 % | HEIGHT: 63 IN | DIASTOLIC BLOOD PRESSURE: 64 MMHG | BODY MASS INDEX: 26.21 KG/M2 | SYSTOLIC BLOOD PRESSURE: 107 MMHG | HEART RATE: 72 BPM | WEIGHT: 147.93 LBS | RESPIRATION RATE: 16 BRPM | TEMPERATURE: 98.3 F

## 2017-02-17 LAB
BASOPHILS # BLD AUTO: 0 K/UL (ref 0–0.1)
BASOPHILS # BLD: 0 % (ref 0–1)
BUN SERPL-MCNC: 7 MG/DL (ref 6–20)
CREAT SERPL-MCNC: 0.67 MG/DL (ref 0.55–1.02)
EOSINOPHIL # BLD: 0 K/UL (ref 0–0.4)
EOSINOPHIL NFR BLD: 0 % (ref 0–7)
ERYTHROCYTE [DISTWIDTH] IN BLOOD BY AUTOMATED COUNT: 12.9 % (ref 11.5–14.5)
HCT VFR BLD AUTO: 35.8 % (ref 35–47)
HGB BLD-MCNC: 11.8 G/DL (ref 11.5–16)
LYMPHOCYTES # BLD AUTO: 12 % (ref 12–49)
LYMPHOCYTES # BLD: 1.5 K/UL (ref 0.8–3.5)
MCH RBC QN AUTO: 30.1 PG (ref 26–34)
MCHC RBC AUTO-ENTMCNC: 33 G/DL (ref 30–36.5)
MCV RBC AUTO: 91.3 FL (ref 80–99)
MONOCYTES # BLD: 1 K/UL (ref 0–1)
MONOCYTES NFR BLD AUTO: 8 % (ref 5–13)
NEUTS SEG # BLD: 9.8 K/UL (ref 1.8–8)
NEUTS SEG NFR BLD AUTO: 80 % (ref 32–75)
PLATELET # BLD AUTO: 246 K/UL (ref 150–400)
RBC # BLD AUTO: 3.92 M/UL (ref 3.8–5.2)
WBC # BLD AUTO: 12.3 K/UL (ref 3.6–11)

## 2017-02-17 PROCEDURE — 74011250636 HC RX REV CODE- 250/636: Performed by: OBSTETRICS & GYNECOLOGY

## 2017-02-17 PROCEDURE — 99218 HC RM OBSERVATION: CPT

## 2017-02-17 PROCEDURE — 84520 ASSAY OF UREA NITROGEN: CPT | Performed by: OBSTETRICS & GYNECOLOGY

## 2017-02-17 PROCEDURE — 85025 COMPLETE CBC W/AUTO DIFF WBC: CPT | Performed by: OBSTETRICS & GYNECOLOGY

## 2017-02-17 PROCEDURE — 82565 ASSAY OF CREATININE: CPT | Performed by: OBSTETRICS & GYNECOLOGY

## 2017-02-17 PROCEDURE — 74011250637 HC RX REV CODE- 250/637: Performed by: OBSTETRICS & GYNECOLOGY

## 2017-02-17 PROCEDURE — 36415 COLL VENOUS BLD VENIPUNCTURE: CPT | Performed by: OBSTETRICS & GYNECOLOGY

## 2017-02-17 RX ORDER — NAPROXEN 500 MG/1
500 TABLET ORAL 2 TIMES DAILY WITH MEALS
Qty: 30 TAB | Refills: 0 | Status: SHIPPED | OUTPATIENT
Start: 2017-02-17

## 2017-02-17 RX ORDER — OXYCODONE AND ACETAMINOPHEN 5; 325 MG/1; MG/1
2 TABLET ORAL
Qty: 30 TAB | Refills: 0 | Status: SHIPPED | OUTPATIENT
Start: 2017-02-17

## 2017-02-17 RX ORDER — ONDANSETRON 4 MG/1
4 TABLET, ORALLY DISINTEGRATING ORAL
Qty: 10 TAB | Refills: 0 | Status: SHIPPED | OUTPATIENT
Start: 2017-02-17

## 2017-02-17 RX ADMIN — OXYCODONE HYDROCHLORIDE AND ACETAMINOPHEN 2 TABLET: 5; 325 TABLET ORAL at 11:14

## 2017-02-17 RX ADMIN — OXYCODONE HYDROCHLORIDE AND ACETAMINOPHEN 2 TABLET: 5; 325 TABLET ORAL at 06:39

## 2017-02-17 RX ADMIN — KETOROLAC TROMETHAMINE 30 MG: 30 INJECTION, SOLUTION INTRAMUSCULAR at 11:14

## 2017-02-17 RX ADMIN — DOCUSATE SODIUM 100 MG: 100 CAPSULE ORAL at 09:42

## 2017-02-17 RX ADMIN — KETOROLAC TROMETHAMINE 30 MG: 30 INJECTION, SOLUTION INTRAMUSCULAR at 00:33

## 2017-02-17 RX ADMIN — KETOROLAC TROMETHAMINE 30 MG: 30 INJECTION, SOLUTION INTRAMUSCULAR at 06:13

## 2017-02-17 RX ADMIN — OXYCODONE HYDROCHLORIDE AND ACETAMINOPHEN 2 TABLET: 5; 325 TABLET ORAL at 15:07

## 2017-02-17 NOTE — DISCHARGE INSTRUCTIONS
Laparoscopic Hysterectomy: What to Expect at [address] can expect to feel better and stronger each day, although you may need pain medicine for a week or two. You may get tired easily or have less energy than usual. This may last for several weeks after surgery. You will probably notice that your belly is swollen and puffy. This is common. The swelling will take several weeks to go down. You may take 4 to 6 weeks to fully recover. It is important to avoid lifting while you are recovering so that you can heal.    Follow-up care is a key part of your treatment and safety. Be sure to make and go to all appointments, and call my office if you are having problems. How can you care for yourself at home? Activity  Rest when you feel tired. Getting enough sleep will help you recover. Try to walk each day. Start by walking a little more than you did the day before. Bit by bit, increase the amount you walk. Walking boosts blood flow and helps prevent pneumonia and constipation. Avoid lifting anything that would make you strain. This may include grocery bags and milk containers, a heavy briefcase, dog food bags, a child, or a vacuum . Avoid strenuous activities, such as housework, aerobic exercise, or  weight lifting, until your doctor says it is okay. You may shower. If you have incisions in your belly, pat them dry when you are done. Do not take a bath for the first 2 weeks or until your doctor tells you it is okay. You may drive when you are no longer taking prescription pain medicine and can quickly move your foot from the gas pedal to the brake. You must also be able to sit comfortably for a long period of time, even if you do not plan on going far. You might get caught in traffic. You will probably need to take 2 to 4 weeks off from work. It depends on the type of work you do and how you feel. Your doctor will tell you when you can have sex again.       Diet  You can eat your normal diet. If your stomach is upset, try bland, low-fat foods like plain rice, broiled chicken, toast, and yogurt. Drink plenty of fluids (unless your doctor tells you not to). You may notice that your bowel movements are not regular right after your surgery. This is common. Try to avoid constipation and straining with bowel movements. You may want to take a fiber supplement or stool softener every day. If you have not had a bowel movement after a couple of days, ask your doctor about taking a mild laxative. Medicines  If the doctor gave you a prescription medicine for pain, take it as prescribed. If you are not taking a prescription pain medicine, take an over-the-counter medicine such as acetaminophen (Tylenol), ibuprofen (Advil, Motrin), or naproxen (Aleve). Read and follow all instructions on the label. Do not take two or more pain medicines at the same time unless the doctor told you to. Many pain medicines contain acetaminophen, which is Tylenol. Too much Tylenol can be harmful. If your doctor prescribed antibiotics, take them as directed. Do not stop taking them just because you feel better. You need to take the full course of antibiotics. If you think your pain medicine is making you sick to your stomach: Take your medicine after meals (unless your doctor tells you not to). Ask your doctor for a different pain medicine. Incision care  If you had surgery with a laparoscope, you may have skin glue or strips of tape over the cuts (incisions) the doctor made in your belly. Gently wash the area daily with warm, soapy water and pat it dry. Do not use other cleaning products, such as hydrogen peroxide which can make the wound heal more slowly. Do NOT bandage the incisions, but you may cover the areas with a gauze bandage if it weeps or rubs against clothing. Change the bandage every day. Keep the area clean and dry. Other instructions  You may have some light vaginal bleeding.  Wear sanitary pads if needed. Do not douche or use tampons. When should you call for help? Call 911 anytime you think you may need emergency care. For example, call if:  You pass out (lose consciousness). You have sudden chest pain and shortness of breath, or you cough up blood. You have severe pain in your belly. Call your doctor now or seek immediate medical care if:  You have bright red vaginal bleeding that soaks one or more pads in an hour, or you have large clots. Your have foul-smelling discharge from your vagina. You are sick to your stomach or cannot keep fluids down. You have pain that does not get better after you take pain medicine. You have loose stitches, or your incision comes open. You have signs of infection, such as: Increased pain, swelling, warmth, or redness. Red streaks leading from your incision. Pus draining from your incision. Swollen lymph nodes in your neck, armpits, or groin. A fever. You have signs of a blood clot, such as:  Pain in your calf, back of knee, thigh, or groin. Redness and swelling in your leg or groin. You have trouble passing urine or stool, especially if you have pain or swelling in your lower belly. You have hot flashes, sweating, flushing, or a fast or pounding heartbeat. Watch closely for changes in your health, and be sure to contact your doctor if:  You do not have a bowel movement after taking a laxative.

## 2017-02-17 NOTE — PROGRESS NOTES
Discharge instructions discussed with patient and her  by Liu Antonio RN. They verbalized understanding. Copy given. Three prescriptions given. SL x 2 DC'd. Discharged via wheelchair.

## 2017-02-17 NOTE — PROGRESS NOTES
Bedside shift change report given to Sintia Sparrow RN (oncoming nurse) by Melanie Floyd RN (offgoing nurse). Report included the following information SBAR, Kardex, Intake/Output, MAR and Recent Results.

## 2017-02-17 NOTE — PROGRESS NOTES
Spiritual Care Partner Volunteer visited patient in 18 on 2.17.16.   Documented by:    Norman Granda M.Div, M.S, Dash [phone] available at [phone]([phone])

## 2017-02-17 NOTE — PROGRESS NOTES
GYN POD 1    Marah K Nghiem      +OOB last night x2. Pain control with Percocet. Tolerating some diet. Awaiting first void. Vitals:  Visit Vitals    /61 (BP 1 Location: Right arm, BP Patient Position: At rest)    Pulse 85    Temp 98.4 °F (36.9 °C)    Resp 18    Ht 5' 2.5\" (1.588 m)    Wt 67.1 kg (147 lb 14.9 oz)    LMP 2017 (Exact Date)    SpO2 94%    Breastfeeding No    BMI 26.63 kg/m2     Temp (24hrs), Av.8 °F (36.6 °C), Min:97.4 °F (36.3 °C), Max:98.4 °F (36.9 °C)      Last 24hr Input/Output:    Intake/Output Summary (Last 24 hours) at 17 0900  Last data filed at 17 0506   Gross per 24 hour   Intake          3793.75 ml   Output             5300 ml   Net         -1506.25 ml      Exam:  Patient without distress. Abdomen soft with gaseous distension. Hypoactive bowel sounds present. Expected tenderness. Incision x4 dry and clean without erythema. Lower extremities are negative for swelling, cords, or tenderness. +SCDs    Labs: Lab Results   Component Value Date/Time    WBC 12.3 2017 04:31 AM    WBC 6.4 2017 06:30 AM    HGB 11.8 2017 04:31 AM    HGB 14.6 2017 06:30 AM    HCT 35.8 2017 04:31 AM    HCT 43.3 2017 06:30 AM    PLATELET 314  04:31 AM    PLATELET 794  06:30 AM       Recent Results (from the past 24 hour(s))   CBC WITH AUTOMATED DIFF    Collection Time: 17  4:31 AM   Result Value Ref Range    WBC 12.3 (H) 3.6 - 11.0 K/uL    RBC 3.92 3.80 - 5.20 M/uL    HGB 11.8 11.5 - 16.0 g/dL    HCT 35.8 35.0 - 47.0 %    MCV 91.3 80.0 - 99.0 FL    MCH 30.1 26.0 - 34.0 PG    MCHC 33.0 30.0 - 36.5 g/dL    RDW 12.9 11.5 - 14.5 %    PLATELET 696 738 - 808 K/uL    NEUTROPHILS 80 (H) 32 - 75 %    LYMPHOCYTES 12 12 - 49 %    MONOCYTES 8 5 - 13 %    EOSINOPHILS 0 0 - 7 %    BASOPHILS 0 0 - 1 %    ABS. NEUTROPHILS 9.8 (H) 1.8 - 8.0 K/UL    ABS. LYMPHOCYTES 1.5 0.8 - 3.5 K/UL    ABS.  MONOCYTES 1.0 0.0 - 1.0 K/UL    ABS. EOSINOPHILS 0.0 0.0 - 0.4 K/UL    ABS. BASOPHILS 0.0 0.0 - 0.1 K/UL   BUN    Collection Time: 02/17/17  4:31 AM   Result Value Ref Range    BUN 7 6 - 20 MG/DL   CREATININE    Collection Time: 02/17/17  4:31 AM   Result Value Ref Range    Creatinine 0.67 0.55 - 1.02 MG/DL    GFR est AA >60 >60 ml/min/1.73m2    GFR est non-AA >60 >60 ml/min/1.73m2     Assessment: POD 1 s/p TLH/BS, stable    Plan:   1. Routine care  2. Advance, ambulate in halls, and await void. 3. Discharge home later today.

## 2017-02-17 NOTE — PROGRESS NOTES
Received call from Dr. Mulugeta Peguero checking on pt; updated MD on pt status, telephone verbal order provided to pull hudson and d/c PCA pump at 0500.

## (undated) DEVICE — DRAPE,REIN 53X77,STERILE: Brand: MEDLINE

## (undated) DEVICE — PAD SANIT NPKN 4IN GRD

## (undated) DEVICE — BARRIER TISS ADH ABSRB 3X4IN -- GYNECARE INTERCEED

## (undated) DEVICE — SOLUTION IRRIGATION H2O 0797305] ICU MEDICAL INC]

## (undated) DEVICE — TUBING INSUF HI FLO HEAT STRL --

## (undated) DEVICE — DRAPE SURG EQUIP W105XH13XL20IN 3 ARM DISPOSABLE DA VINCI S

## (undated) DEVICE — COVER,TABLE,60X90,STERILE: Brand: MEDLINE

## (undated) DEVICE — SUTURE VCRL SZ 0 L18IN ABSRB UD POLYGLACTIN 910 COAT BRAID J646H

## (undated) DEVICE — (D)PREP SKN CHLRAPRP APPL 26ML -- CONVERT TO ITEM 371833

## (undated) DEVICE — NEEDLE INSUF L120MM ULT VERES ENDOPATH

## (undated) DEVICE — 3M™ DURAPORE™ SURGICAL TAPE 1538-3, 3 INCH X 10 YARD (7,5CM X 9,1M), 4 ROLLS/BOX: Brand: 3M™ DURAPORE™

## (undated) DEVICE — TIP COVER ACCESSORY

## (undated) DEVICE — Z DISCONTINUED NO SUB IDED TROCAR LAP DIA8MM STD LEN BLDELSS TAPR TIP THRD N OPT VW

## (undated) DEVICE — OBTRTR BLDELSS 8MM DISP -- DA VINCI - SNGL USE

## (undated) DEVICE — VCARE MEDIUM, UTERINE MANIPULATOR, VAGINAL-CERVICAL-AHLUWALIA'S-RETRACTOR-ELEVATOR: Brand: VCARE

## (undated) DEVICE — TUBING FLTR PLUME AWAY EVAC W/ SUCT DEV DISP PUREVIEW

## (undated) DEVICE — VISUALIZATION SYSTEM: Brand: CLEARIFY

## (undated) DEVICE — STERILE POLYISOPRENE POWDER-FREE SURGICAL GLOVES: Brand: PROTEXIS

## (undated) DEVICE — 3M™ IOBAN™ 2 ANTIMICROBIAL INCISE DRAPE 6648EZ: Brand: IOBAN™ 2

## (undated) DEVICE — COVER LT HNDL BLU PLAS

## (undated) DEVICE — BLADELESS OPTICAL TROCAR WITH FIXATION CANNULA: Brand: VERSAONE

## (undated) DEVICE — NEEDLE HYPO 22GA L1.5IN BLK S STL HUB POLYPR SHLD REG BVL

## (undated) DEVICE — REM POLYHESIVE ADULT PATIENT RETURN ELECTRODE: Brand: VALLEYLAB

## (undated) DEVICE — ELECTRO LUBE IS A SINGLE PATIENT USE DEVICE THAT IS INTENDED TO BE USED ON ELECTROSURGICAL ELECTRODES TO REDUCE STICKING.: Brand: KEY SURGICAL ELECTRO LUBE

## (undated) DEVICE — SUTURE CHROMIC GUT SZ 2-0 L27IN ABSRB BRN L26MM SH 1/2 CIR G123H

## (undated) DEVICE — SOLUTION IV 1000ML 0.9% SOD CHL

## (undated) DEVICE — KIT INFECTION CTRL ST FRAN --

## (undated) DEVICE — INSTRMT SET WND CLSR SUT PASS --

## (undated) DEVICE — 3000CC GUARDIAN II: Brand: GUARDIAN

## (undated) DEVICE — CYSTO/BLADDER IRRIGATION SET, REGULATING CLAMP

## (undated) DEVICE — SUTURE V-LOC 180 SZ 0 L12IN ABSRB GRN L37MM GS-21 1/2 CIR VLOCL0316

## (undated) DEVICE — TRAY CATH OD16FR SIL URIN M STATLOK STBL DEV SURSTP

## (undated) DEVICE — CLICKLINE SCISSORS INSERT: Brand: CLICKLINE

## (undated) DEVICE — BLADELESS OPTICAL TROCAR WITH FIXATION CANNULA: Brand: VERSAPORT

## (undated) DEVICE — INTENDED FOR TISSUE SEPARATION, AND OTHER PROCEDURES THAT REQUIRE A SHARP SURGICAL BLADE TO PUNCTURE OR CUT.: Brand: BARD-PARKER ® CARBON RIB-BACK BLADES

## (undated) DEVICE — Device

## (undated) DEVICE — SURGICAL PROCEDURE PACK GYN LAPAROSCOPY CUST SMH LF

## (undated) DEVICE — GOWN,SIRUS,FABRNF,XL,20/CS: Brand: MEDLINE

## (undated) DEVICE — TRAY PREP DRY W/ PREM GLV 2 APPL 6 SPNG 2 UNDPD 1 OVERWRAP

## (undated) DEVICE — DERMABOND SKIN ADH 0.7ML -- DERMABOND ADVANCED 12/BX

## (undated) DEVICE — (D)SYR 10ML 1/5ML GRAD NSAF -- PKGING CHANGE USE ITEM 338027

## (undated) DEVICE — SUTURE MCRYL SZ 4-0 L27IN ABSRB UD L19MM PS-2 1/2 CIR PRIM Y426H